# Patient Record
Sex: FEMALE | Employment: FULL TIME | URBAN - METROPOLITAN AREA
[De-identification: names, ages, dates, MRNs, and addresses within clinical notes are randomized per-mention and may not be internally consistent; named-entity substitution may affect disease eponyms.]

---

## 2020-03-18 ENCOUNTER — SKIN CANCER EXAM (OUTPATIENT)
Dept: URBAN - METROPOLITAN AREA CLINIC 34 | Facility: CLINIC | Age: 60
Setting detail: DERMATOLOGY
End: 2020-03-18

## 2020-03-18 DIAGNOSIS — D48.5 NEOPLASM OF UNCERTAIN BEHAVIOR OF SKIN: ICD-10-CM

## 2020-03-18 DIAGNOSIS — B07.0 PLANTAR WART: ICD-10-CM

## 2020-03-18 DIAGNOSIS — L72.3 SEBACEOUS CYST: ICD-10-CM

## 2020-03-18 DIAGNOSIS — D22.5 MELANOCYTIC NEVI OF TRUNK: ICD-10-CM

## 2020-03-18 PROBLEM — L82.1 OTHER SEBORRHEIC KERATOSIS: Status: RESOLVED | Noted: 2020-03-18

## 2020-03-18 PROCEDURE — 99203 OFFICE O/P NEW LOW 30 MIN: CPT

## 2020-03-18 RX ORDER — MOMETASONE FUROATE 1 MG/ML
1 ML SOLUTION TOPICAL BID
Qty: 60 | Refills: 2
Start: 2020-03-18

## 2020-05-25 PROBLEM — 305058001: Status: ACTIVE | Noted: 2020-05-25

## 2020-06-11 ENCOUNTER — OFFICE VISIT (OUTPATIENT)
Dept: URBAN - METROPOLITAN AREA CLINIC 35 | Facility: CLINIC | Age: 60
End: 2020-06-11

## 2020-06-17 ENCOUNTER — TELEPHONE ENCOUNTER (OUTPATIENT)
Dept: URBAN - METROPOLITAN AREA CLINIC 35 | Facility: CLINIC | Age: 60
End: 2020-06-17

## 2020-06-17 ENCOUNTER — OFFICE VISIT (OUTPATIENT)
Dept: URBAN - METROPOLITAN AREA SURGERY CENTER 8 | Facility: SURGERY CENTER | Age: 60
End: 2020-06-17

## 2020-06-17 RX ORDER — SERTRALINE HYDROCHLORIDE 50 MG/1
1 TABLET TABLET, FILM COATED ORAL ONCE A DAY
Status: ACTIVE | COMMUNITY

## 2020-06-17 RX ORDER — POLYETHYLENE GLYCOL 3350, SODIUM SULFATE, SODIUM CHLORIDE, POTASSIUM CHLORIDE, ASCORBIC ACID, SODIUM ASCORBATE 140-9-5.2G
AS DIRECTED KIT ORAL ONCE
Qty: 1 KIT | Refills: 0 | Status: ACTIVE | COMMUNITY
Start: 2020-05-26

## 2020-06-17 RX ORDER — DIPHENOXYLATE HYDROCHLORIDE AND ATROPINE SULFATE 2.5; .025 MG/1; MG/1
1 TABLET AS NEEDED TABLET ORAL
Status: ACTIVE | COMMUNITY

## 2020-06-17 RX ORDER — DOXAZOSIN MESYLATE 2 MG/1
1 TABLET TABLET ORAL ONCE A DAY
Status: ACTIVE | COMMUNITY

## 2020-06-17 RX ORDER — CHOLESTYRAMINE 4 G/9G
1/2 SCOOP POWDER, FOR SUSPENSION ORAL TWICE A DAY
Qty: 120 GRAM | Refills: 1 | OUTPATIENT
Start: 2020-06-17

## 2020-06-17 RX ORDER — LEVOTHYROXINE SODIUM 50 UG/1
1 TABLET IN THE MORNING ON AN EMPTY STOMACH TABLET ORAL ONCE A DAY
Status: ACTIVE | COMMUNITY

## 2020-06-17 RX ORDER — ALPRAZOLAM 0.5 MG/1
1 TABLET TABLET ORAL TWICE A DAY
Status: ACTIVE | COMMUNITY

## 2020-06-17 RX ORDER — ONDANSETRON HYDROCHLORIDE 8 MG/1
1 TABLET TABLET, FILM COATED ORAL
Status: ACTIVE | COMMUNITY

## 2020-06-29 PROBLEM — 92318000: Status: ACTIVE | Noted: 2020-06-24

## 2020-06-29 PROBLEM — 92076000: Status: ACTIVE | Noted: 2020-06-24

## 2020-06-30 ENCOUNTER — OFFICE VISIT (OUTPATIENT)
Dept: URBAN - METROPOLITAN AREA CLINIC 37 | Facility: CLINIC | Age: 60
End: 2020-06-30

## 2020-06-30 VITALS — WEIGHT: 255 LBS | BODY MASS INDEX: 40.54 KG/M2

## 2020-06-30 RX ORDER — LEVOTHYROXINE SODIUM 50 UG/1
1 TABLET IN THE MORNING ON AN EMPTY STOMACH TABLET ORAL ONCE A DAY
Status: ACTIVE | COMMUNITY

## 2020-06-30 RX ORDER — SERTRALINE HYDROCHLORIDE 50 MG/1
1 TABLET TABLET, FILM COATED ORAL ONCE A DAY
Status: ACTIVE | COMMUNITY

## 2020-06-30 RX ORDER — ALPRAZOLAM 0.5 MG/1
1 TABLET TABLET ORAL TWICE A DAY
Status: ACTIVE | COMMUNITY

## 2020-06-30 RX ORDER — CHOLESTYRAMINE 4 G/9G
1/2 SCOOP POWDER, FOR SUSPENSION ORAL TWICE A DAY
Qty: 60 GRAM | Refills: 5 | OUTPATIENT
Start: 2020-06-29

## 2020-06-30 RX ORDER — ONDANSETRON HYDROCHLORIDE 8 MG/1
1 TABLET TABLET, FILM COATED ORAL
Status: ACTIVE | COMMUNITY

## 2020-06-30 RX ORDER — DIPHENOXYLATE HYDROCHLORIDE AND ATROPINE SULFATE 2.5; .025 MG/1; MG/1
1 TABLET AS NEEDED TABLET ORAL
Status: ACTIVE | COMMUNITY

## 2020-06-30 RX ORDER — DOXAZOSIN MESYLATE 2 MG/1
1 TABLET TABLET ORAL ONCE A DAY
Status: ACTIVE | COMMUNITY

## 2020-06-30 RX ORDER — CHOLESTYRAMINE 4 G/9G
1/2 SCOOP POWDER, FOR SUSPENSION ORAL TWICE A DAY
Qty: 120 GRAM | Refills: 1 | Status: ACTIVE | COMMUNITY
Start: 2020-06-17

## 2020-06-30 NOTE — HPI-MIGRATED HPI
Interim investigations : Labs done on: ->  * 6/11/20: negative for Covid-19 * 05/05/2020, ordered by PCP:  - CMP: Glucose: 87; BUN: 9; Cr: 0.82; Na: 139; K: 4.0; Alb: 4.7; ALT: 34 (H); AST: 17; ALP: 98; Tbili: 0.4 - CBC with: WBC:10.4; RBC:4.86; Hgb:14.3; Hct:42.2; Plt: 416 (H) * 04/29/2020, ordered by PCP:  - C. diff PCR: not detected - Shiga toxins, EIA w/ reflex to Ecoli.: not detected - Campylobacter, culture: no enteric Camylobacter isolated - Salmonella and shigella, culture: No salmonela or shigella isolated - Fecal leukocyte stain: not detected - Ova and parasite: no ova or parasites seen;   Interim investigations : Imaging studies: ->  * CT scan Abd/pelvis w contrast on 05/11/2020:  1. The gallbladder is surgically absent.  2. Large bowel, small bowel demonstrate no acute findings. 3. No acute intra-abdominal inflammatory process;   Post-op OV : Patient -> denies any new changes in his/her health status since last OV;   Post-op OV : After Colonoscopy on -> 6/17/20, at which time an 8 mm pedunculated polyp and 1 small polyp were found and removed. Histology showed that the larger polyp was tubulovillous adenoma while the other one was hyperplastic polyp. No evidence of colitis. The entire colon bxx show benign colonic mucosa with no histopathological abnormality. Grade I hemorrhoids were noted upon retroflexion. Bowel prep was sub-optimal Patient has NOT been taking Cholestyramine 2 gm (1/2 scoop) BID daily for diarrhea Current BM pattern: still with loose stools every 2 hours;   Post-op OV : Patient denies -> rectal bleeding, fever, nausea & vomiting since the procedure date;

## 2020-07-09 ENCOUNTER — LAB OUTSIDE AN ENCOUNTER (OUTPATIENT)
Dept: URBAN - METROPOLITAN AREA CLINIC 37 | Facility: CLINIC | Age: 60
End: 2020-07-09

## 2020-07-09 ENCOUNTER — OFFICE VISIT (OUTPATIENT)
Dept: URBAN - METROPOLITAN AREA CLINIC 37 | Facility: CLINIC | Age: 60
End: 2020-07-09

## 2020-07-09 VITALS — WEIGHT: 253 LBS | BODY MASS INDEX: 40.22 KG/M2

## 2020-07-09 PROBLEM — 62315008: Status: ACTIVE | Noted: 2020-05-25

## 2020-07-09 PROBLEM — 721703004: Status: ACTIVE | Noted: 2020-06-24

## 2020-07-09 PROBLEM — 16932000: Status: ACTIVE | Noted: 2020-07-09

## 2020-07-09 PROBLEM — 34486009: Status: ACTIVE | Noted: 2020-05-25

## 2020-07-09 RX ORDER — DOXAZOSIN MESYLATE 2 MG/1
1 TABLET TABLET ORAL ONCE A DAY
Status: ACTIVE | COMMUNITY

## 2020-07-09 RX ORDER — DIPHENOXYLATE HYDROCHLORIDE AND ATROPINE SULFATE 2.5; .025 MG/1; MG/1
1 TABLET AS NEEDED TABLET ORAL
Status: ACTIVE | COMMUNITY

## 2020-07-09 RX ORDER — CHOLESTYRAMINE 4 G/9G
1/2 SCOOP POWDER, FOR SUSPENSION ORAL TWICE A DAY
Qty: 60 GRAM | Refills: 5 | Status: ACTIVE | COMMUNITY
Start: 2020-06-29

## 2020-07-09 RX ORDER — SERTRALINE HYDROCHLORIDE 50 MG/1
1 TABLET TABLET, FILM COATED ORAL ONCE A DAY
Status: ACTIVE | COMMUNITY

## 2020-07-09 RX ORDER — LEVOTHYROXINE SODIUM 50 UG/1
1 TABLET IN THE MORNING ON AN EMPTY STOMACH TABLET ORAL ONCE A DAY
Status: ACTIVE | COMMUNITY

## 2020-07-09 RX ORDER — ALPRAZOLAM 0.5 MG/1
1 TABLET TABLET ORAL TWICE A DAY
Status: ACTIVE | COMMUNITY

## 2020-07-09 RX ORDER — ONDANSETRON HYDROCHLORIDE 8 MG/1
1 TABLET TABLET, FILM COATED ORAL
Status: ACTIVE | COMMUNITY

## 2020-07-09 RX ORDER — CHOLESTYRAMINE 4 G/9G
1/2 SCOOP POWDER, FOR SUSPENSION ORAL QAM
Qty: 60 GRAM | Refills: 5 | OUTPATIENT
Start: 2020-07-09

## 2020-07-09 NOTE — HPI-MIGRATED HPI
Interim investigations : Labs done on: ->  * 6/11/20: negative for Covid-19 * 05/05/2020, ordered by PCP:  - CMP: Glucose: 87; BUN: 9; Cr: 0.82; Na: 139; K: 4.0; Alb: 4.7; ALT: 34 (H); AST: 17; ALP: 98; Tbili: 0.4 - CBC with: WBC:10.4; RBC:4.86; Hgb:14.3; Hct:42.2; Plt: 416 (H) * 04/29/2020, ordered by PCP:  - C. diff PCR: not detected - Shiga toxins, EIA w/ reflex to Ecoli.: not detected - Campylobacter, culture: no enteric Camylobacter isolated - Salmonella and shigella, culture: No salmonela or shigella isolated - Fecal leukocyte stain: not detected - Ova and parasite: no ova or parasites seen;   Interim investigations : Imaging studies: ->  * CT scan Abd/pelvis w contrast on 05/11/2020:  1. The gallbladder is surgically absent.  2. Large bowel, small bowel demonstrate no acute findings. 3. No acute intra-abdominal inflammatory process;   Follow up OV : Patient is here for a routine OV for -> diarrhea/nausea/vomiting This is a Telehealth visit and patient has consent for this visit;   Follow up OV : Last OV was -> 1 week ago;   Follow up OV : Patient is on -> Cholestyramine 1/2 scoop BID since last OV May need to up-titrate the dose or proceed to EGD/SBE if not better Current BM pattern: QOD with one hard stool (BS 1-2) Has been able to eat more, but not yet at baseline Still has intermittent nausea;

## 2020-07-09 NOTE — EXAM-MIGRATED EXAMINATIONS
GENERAL APPEARANCE: - alert, in mild distress, well developed, well nourished;   NECK/THYROID: - neck supple, full range of motion, trachea midline;   NEUROLOGIC: - cranial nerves 2-12 grossly intact;   PSYCH: - cooperative with exam, mood/affect full range;

## 2020-07-10 ENCOUNTER — OFFICE VISIT (OUTPATIENT)
Dept: URBAN - METROPOLITAN AREA CLINIC 35 | Facility: CLINIC | Age: 60
End: 2020-07-10

## 2020-07-10 RX ORDER — ONDANSETRON HYDROCHLORIDE 8 MG/1
1 TABLET TABLET, FILM COATED ORAL
Status: ACTIVE | COMMUNITY

## 2020-07-10 RX ORDER — CHOLESTYRAMINE 4 G/9G
1/2 SCOOP POWDER, FOR SUSPENSION ORAL TWICE A DAY
Qty: 60 GRAM | Refills: 5 | Status: ACTIVE | COMMUNITY
Start: 2020-06-29

## 2020-07-10 RX ORDER — SERTRALINE HYDROCHLORIDE 50 MG/1
1 TABLET TABLET, FILM COATED ORAL ONCE A DAY
Status: ACTIVE | COMMUNITY

## 2020-07-10 RX ORDER — LEVOTHYROXINE SODIUM 50 UG/1
1 TABLET IN THE MORNING ON AN EMPTY STOMACH TABLET ORAL ONCE A DAY
Status: ACTIVE | COMMUNITY

## 2020-07-10 RX ORDER — DOXAZOSIN MESYLATE 2 MG/1
1 TABLET TABLET ORAL ONCE A DAY
Status: ACTIVE | COMMUNITY

## 2020-07-10 RX ORDER — ALPRAZOLAM 0.5 MG/1
1 TABLET TABLET ORAL TWICE A DAY
Status: ACTIVE | COMMUNITY

## 2020-07-10 RX ORDER — CHOLESTYRAMINE 4 G/9G
1/2 SCOOP POWDER, FOR SUSPENSION ORAL QAM
Qty: 60 GRAM | Refills: 5 | Status: ACTIVE | COMMUNITY
Start: 2020-07-09

## 2020-07-10 RX ORDER — DIPHENOXYLATE HYDROCHLORIDE AND ATROPINE SULFATE 2.5; .025 MG/1; MG/1
1 TABLET AS NEEDED TABLET ORAL
Status: ACTIVE | COMMUNITY

## 2020-07-15 ENCOUNTER — OFFICE VISIT (OUTPATIENT)
Dept: URBAN - METROPOLITAN AREA SURGERY CENTER 8 | Facility: SURGERY CENTER | Age: 60
End: 2020-07-15

## 2020-07-15 ENCOUNTER — TELEPHONE ENCOUNTER (OUTPATIENT)
Dept: URBAN - METROPOLITAN AREA CLINIC 35 | Facility: CLINIC | Age: 60
End: 2020-07-15

## 2020-07-15 RX ORDER — SERTRALINE HYDROCHLORIDE 50 MG/1
1 TABLET TABLET, FILM COATED ORAL ONCE A DAY
Status: ACTIVE | COMMUNITY

## 2020-07-15 RX ORDER — DIPHENOXYLATE HYDROCHLORIDE AND ATROPINE SULFATE 2.5; .025 MG/1; MG/1
1 TABLET AS NEEDED TABLET ORAL
Status: ACTIVE | COMMUNITY

## 2020-07-15 RX ORDER — CHOLESTYRAMINE 4 G/9G
1/2 SCOOP POWDER, FOR SUSPENSION ORAL TWICE A DAY
Qty: 60 GRAM | Refills: 5 | Status: ACTIVE | COMMUNITY
Start: 2020-06-29

## 2020-07-15 RX ORDER — LEVOTHYROXINE SODIUM 50 UG/1
1 TABLET IN THE MORNING ON AN EMPTY STOMACH TABLET ORAL ONCE A DAY
Status: ACTIVE | COMMUNITY

## 2020-07-15 RX ORDER — DOXAZOSIN MESYLATE 2 MG/1
1 TABLET TABLET ORAL ONCE A DAY
Status: ACTIVE | COMMUNITY

## 2020-07-15 RX ORDER — ALPRAZOLAM 0.5 MG/1
1 TABLET TABLET ORAL TWICE A DAY
Status: ACTIVE | COMMUNITY

## 2020-07-15 RX ORDER — CHOLESTYRAMINE 4 G/9G
1/2 SCOOP POWDER, FOR SUSPENSION ORAL QAM
Qty: 60 GRAM | Refills: 5 | Status: ACTIVE | COMMUNITY
Start: 2020-07-09

## 2020-07-15 RX ORDER — ONDANSETRON HYDROCHLORIDE 8 MG/1
1 TABLET TABLET, FILM COATED ORAL
Status: ACTIVE | COMMUNITY

## 2020-07-15 RX ORDER — OMEPRAZOLE 40 MG/1
1 CAPSULE 30 MINUTES BEFORE MORNING MEAL CAPSULE, DELAYED RELEASE ORAL
Qty: 60 | Refills: 2 | OUTPATIENT
Start: 2020-07-15

## 2020-07-28 ENCOUNTER — TELEPHONE ENCOUNTER (OUTPATIENT)
Dept: URBAN - METROPOLITAN AREA CLINIC 35 | Facility: CLINIC | Age: 60
End: 2020-07-28

## 2020-07-31 ENCOUNTER — TELEPHONE ENCOUNTER (OUTPATIENT)
Dept: URBAN - METROPOLITAN AREA CLINIC 35 | Facility: CLINIC | Age: 60
End: 2020-07-31

## 2020-07-31 ENCOUNTER — OFFICE VISIT (OUTPATIENT)
Dept: URBAN - METROPOLITAN AREA CLINIC 37 | Facility: CLINIC | Age: 60
End: 2020-07-31

## 2020-07-31 VITALS — WEIGHT: 252 LBS | BODY MASS INDEX: 40.06 KG/M2

## 2020-07-31 RX ORDER — DOXAZOSIN MESYLATE 2 MG/1
1 TABLET TABLET ORAL ONCE A DAY
Status: ACTIVE | COMMUNITY

## 2020-07-31 RX ORDER — OMEPRAZOLE 40 MG/1
1 CAPSULE 30 MINUTES BEFORE MORNING MEAL CAPSULE, DELAYED RELEASE ORAL
Qty: 60 | Refills: 5
Start: 2020-07-30

## 2020-07-31 RX ORDER — CHOLESTYRAMINE 4 G/9G
1/2 SCOOP POWDER, FOR SUSPENSION ORAL QAM
Qty: 60 GRAM | Refills: 5 | OUTPATIENT

## 2020-07-31 RX ORDER — ONDANSETRON HYDROCHLORIDE 8 MG/1
1 TABLET TABLET, FILM COATED ORAL
Status: ACTIVE | COMMUNITY

## 2020-07-31 RX ORDER — LEVOTHYROXINE SODIUM 50 UG/1
1 TABLET IN THE MORNING ON AN EMPTY STOMACH TABLET ORAL ONCE A DAY
Status: ACTIVE | COMMUNITY

## 2020-07-31 RX ORDER — DIPHENOXYLATE HYDROCHLORIDE AND ATROPINE SULFATE 2.5; .025 MG/1; MG/1
1 TABLET AS NEEDED TABLET ORAL
Status: ACTIVE | COMMUNITY

## 2020-07-31 RX ORDER — CHOLESTYRAMINE 4 G/9G
1/2 SCOOP POWDER, FOR SUSPENSION ORAL TWICE A DAY
Qty: 60 GRAM | Refills: 5 | Status: ACTIVE | COMMUNITY
Start: 2020-06-29

## 2020-07-31 RX ORDER — ALPRAZOLAM 0.5 MG/1
1 TABLET TABLET ORAL TWICE A DAY
Status: ACTIVE | COMMUNITY

## 2020-07-31 RX ORDER — OMEPRAZOLE 40 MG/1
1 CAPSULE 30 MINUTES BEFORE MORNING MEAL CAPSULE, DELAYED RELEASE ORAL
Qty: 60 | Refills: 2 | Status: ACTIVE | COMMUNITY
Start: 2020-07-15

## 2020-07-31 RX ORDER — SERTRALINE HYDROCHLORIDE 50 MG/1
1 TABLET TABLET, FILM COATED ORAL ONCE A DAY
Status: ACTIVE | COMMUNITY

## 2020-07-31 NOTE — EXAM-MIGRATED EXAMINATIONS
GENERAL APPEARANCE: - alert, well developed, well nourished, in moderate distress;   HEAD: - normocephalic, atraumatic;   EYES: - sclera anicteric bilaterally;   NECK/THYROID: - neck supple, full range of motion, no cervical lymphadenopathy, no thyroid nodules, no thyromegaly, trachea midline;   NEUROLOGIC: - cranial nerves 2-12 grossly intact;   PSYCH: - cooperative with exam, mood/affect full range, tearful at time;

## 2020-07-31 NOTE — HPI-MIGRATED HPI
Post-op OV : Patient denies -> rectal bleeding, fever, nausea & vomiting since the procedure date;   Post-op OV : Patient -> denies any new changes in his/her health status since last OV. 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 Severe esophagitis was present in the distal esophagus with bxx confirming moderate acute and chronic inflammation and reactive changes which may be seen in reflux esophagitis with intestinal metaplasia, indefinite for dysplasia. A small hiatal hernia present at GE junction with out obstruction or gangrene. Mild gastritis was noted in the gastric antrum with bxx confirming minimal chronic gastritis. No H. pylori or IM identified. Normal duodenum with bxx confirming normal villous pattern. Patient was prescribed Omeprazole 40 mg PO BID after the procedure ;   Post-op OV : Patient reports of current symptoms -> Patient continues taking Cholestyramine, 1/2 scoop QAM - BID Current BM: occasional diarrhea, especially on the days that she at work;

## 2020-08-13 ENCOUNTER — OFFICE VISIT (OUTPATIENT)
Dept: URBAN - METROPOLITAN AREA CLINIC 37 | Facility: CLINIC | Age: 60
End: 2020-08-13

## 2020-08-20 ENCOUNTER — OFFICE VISIT (OUTPATIENT)
Dept: URBAN - METROPOLITAN AREA CLINIC 37 | Facility: CLINIC | Age: 60
End: 2020-08-20

## 2020-08-20 VITALS — WEIGHT: 252 LBS | BODY MASS INDEX: 40.5 KG/M2 | HEIGHT: 66 IN

## 2020-08-20 RX ORDER — CHOLESTYRAMINE 4 G/9G
1/2 SCOOP POWDER, FOR SUSPENSION ORAL QAM
Qty: 60 GRAM | Refills: 2

## 2020-08-20 RX ORDER — DIPHENOXYLATE HYDROCHLORIDE AND ATROPINE SULFATE 2.5; .025 MG/1; MG/1
1 TABLET AS NEEDED TABLET ORAL
Status: ACTIVE | COMMUNITY

## 2020-08-20 RX ORDER — DOXAZOSIN MESYLATE 2 MG/1
1 TABLET TABLET ORAL ONCE A DAY
Status: ACTIVE | COMMUNITY

## 2020-08-20 RX ORDER — ONDANSETRON HYDROCHLORIDE 8 MG/1
1 TABLET TABLET, FILM COATED ORAL
Status: ACTIVE | COMMUNITY

## 2020-08-20 RX ORDER — OMEPRAZOLE 40 MG/1
1 CAPSULE 30 MINUTES BEFORE MORNING MEAL CAPSULE, DELAYED RELEASE ORAL
Qty: 60 | Refills: 5 | Status: ACTIVE | COMMUNITY
Start: 2020-07-30

## 2020-08-20 RX ORDER — LEVOTHYROXINE SODIUM 50 UG/1
1 TABLET IN THE MORNING ON AN EMPTY STOMACH TABLET ORAL ONCE A DAY
Status: ACTIVE | COMMUNITY

## 2020-08-20 RX ORDER — CHOLESTYRAMINE 4 G/9G
1/2 SCOOP POWDER, FOR SUSPENSION ORAL TWICE A DAY
Qty: 60 GRAM | Refills: 5 | Status: ACTIVE | COMMUNITY
Start: 2020-06-29

## 2020-08-20 RX ORDER — OMEPRAZOLE 40 MG/1
1 CAPSULE 30 MINUTES BEFORE MORNING MEAL CAPSULE, DELAYED RELEASE ORAL
Qty: 60 | Refills: 5 | OUTPATIENT

## 2020-08-20 RX ORDER — ALPRAZOLAM 0.5 MG/1
1 TABLET TABLET ORAL TWICE A DAY
Status: ACTIVE | COMMUNITY

## 2020-08-20 RX ORDER — SERTRALINE HYDROCHLORIDE 50 MG/1
1 TABLET TABLET, FILM COATED ORAL ONCE A DAY
Status: ACTIVE | COMMUNITY

## 2020-08-20 NOTE — HPI-MIGRATED HPI
Follow up OV : Patient is here for a routine OV for -> Louise's Esophagus and Post-cholecytectomy syndrome;   Follow up OV : Last OV was -> 2 weeks ago Louise's Esophagus/Gastritis:  Last EGD done 07/2020, small hital hernia present, endocopic evidence of BE and Gastritis. No H.pylori  Patient continues on Omeprazole 40 mg BID Current symptoms: "symptoms are improving"  Post-cholecystectomy syndrome:  Lap CCY done 2001 Patient continues on Cholestyramine 4 gm, 1/2 scoop daily to BID PRN Current symptoms: 1-2x soft - formed stool/day  Fatigue: Things are better but still not feeling 100% of her normal self Still feels "wiped out" after 1 day of work;

## 2020-09-03 ENCOUNTER — LAB OUTSIDE AN ENCOUNTER (OUTPATIENT)
Dept: URBAN - METROPOLITAN AREA CLINIC 37 | Facility: CLINIC | Age: 60
End: 2020-09-03

## 2020-09-03 ENCOUNTER — OFFICE VISIT (OUTPATIENT)
Dept: URBAN - METROPOLITAN AREA CLINIC 37 | Facility: CLINIC | Age: 60
End: 2020-09-03

## 2020-09-03 VITALS — BODY MASS INDEX: 40.5 KG/M2 | HEIGHT: 66 IN | WEIGHT: 252 LBS

## 2020-09-03 RX ORDER — SERTRALINE HYDROCHLORIDE 50 MG/1
1 TABLET TABLET, FILM COATED ORAL ONCE A DAY
Status: ACTIVE | COMMUNITY

## 2020-09-03 RX ORDER — OMEPRAZOLE 40 MG/1
1 CAPSULE 30 MINUTES BEFORE MORNING MEAL CAPSULE, DELAYED RELEASE ORAL
Qty: 60 | Refills: 5 | OUTPATIENT

## 2020-09-03 RX ORDER — OMEPRAZOLE 40 MG/1
1 CAPSULE 30 MINUTES BEFORE MORNING MEAL CAPSULE, DELAYED RELEASE ORAL
Qty: 60 | Refills: 5 | Status: ACTIVE | COMMUNITY

## 2020-09-03 RX ORDER — ONDANSETRON HYDROCHLORIDE 8 MG/1
1 TABLET TABLET, FILM COATED ORAL
Status: ACTIVE | COMMUNITY

## 2020-09-03 RX ORDER — ALPRAZOLAM 0.5 MG/1
1 TABLET TABLET ORAL TWICE A DAY
Status: ACTIVE | COMMUNITY

## 2020-09-03 RX ORDER — DIPHENOXYLATE HYDROCHLORIDE AND ATROPINE SULFATE 2.5; .025 MG/1; MG/1
1 TABLET AS NEEDED TABLET ORAL
Status: ACTIVE | COMMUNITY

## 2020-09-03 RX ORDER — LEVOTHYROXINE SODIUM 50 UG/1
1 TABLET IN THE MORNING ON AN EMPTY STOMACH TABLET ORAL ONCE A DAY
Status: ACTIVE | COMMUNITY

## 2020-09-03 RX ORDER — CHOLESTYRAMINE 4 G/9G
1/2 SCOOP POWDER, FOR SUSPENSION ORAL TWICE A DAY
Qty: 60 GRAM | Refills: 5 | Status: ACTIVE | COMMUNITY
Start: 2020-06-29

## 2020-09-03 RX ORDER — CHOLESTYRAMINE 4 G/9G
1/2 SCOOP POWDER, FOR SUSPENSION ORAL BID
Qty: 30 GRAM | Refills: 2

## 2020-09-03 RX ORDER — DOXAZOSIN MESYLATE 2 MG/1
1 TABLET TABLET ORAL ONCE A DAY
Status: ACTIVE | COMMUNITY

## 2020-09-17 ENCOUNTER — OFFICE VISIT (OUTPATIENT)
Dept: URBAN - METROPOLITAN AREA CLINIC 37 | Facility: CLINIC | Age: 60
End: 2020-09-17

## 2020-09-17 VITALS — HEIGHT: 66 IN

## 2020-09-17 RX ORDER — ALPRAZOLAM 0.5 MG/1
1 TABLET TABLET ORAL TWICE A DAY
COMMUNITY

## 2020-09-17 RX ORDER — SERTRALINE HYDROCHLORIDE 50 MG/1
1 TABLET TABLET, FILM COATED ORAL ONCE A DAY
COMMUNITY

## 2020-09-17 RX ORDER — CHOLESTYRAMINE 4 G/9G
1/2 SCOOP POWDER, FOR SUSPENSION ORAL TWICE A DAY
Qty: 60 GRAM | Refills: 5 | COMMUNITY
Start: 2020-06-29

## 2020-09-17 RX ORDER — DIPHENOXYLATE HYDROCHLORIDE AND ATROPINE SULFATE 2.5; .025 MG/1; MG/1
1 TABLET AS NEEDED TABLET ORAL
COMMUNITY

## 2020-09-17 RX ORDER — LEVOTHYROXINE SODIUM 50 UG/1
1 TABLET IN THE MORNING ON AN EMPTY STOMACH TABLET ORAL ONCE A DAY
COMMUNITY

## 2020-09-17 RX ORDER — CHOLESTYRAMINE 4 G/9G
1/2 SCOOP POWDER, FOR SUSPENSION ORAL BID
Qty: 30 GRAM | Refills: 2 | COMMUNITY

## 2020-09-17 RX ORDER — ONDANSETRON HYDROCHLORIDE 8 MG/1
1 TABLET TABLET, FILM COATED ORAL
COMMUNITY

## 2020-09-17 RX ORDER — DOXAZOSIN MESYLATE 2 MG/1
1 TABLET TABLET ORAL ONCE A DAY
COMMUNITY

## 2020-09-17 RX ORDER — OMEPRAZOLE 40 MG/1
1 CAPSULE 30 MINUTES BEFORE MORNING MEAL CAPSULE, DELAYED RELEASE ORAL
Qty: 60 | Refills: 5 | COMMUNITY

## 2020-09-23 ENCOUNTER — LAB OUTSIDE AN ENCOUNTER (OUTPATIENT)
Dept: URBAN - METROPOLITAN AREA CLINIC 37 | Facility: CLINIC | Age: 60
End: 2020-09-23

## 2020-09-24 LAB
ALBUMIN/GLOBULIN RATIO: 1.5
ALBUMIN: 4.2
ALKALINE PHOSPHATASE: 142
ALT: 63
AST: 30
BILIRUBIN, TOTAL: 0.6
BUN/CREATININE RATIO: (no result)
CALCIUM, IONIZED: 4.8
CALCIUM: 9.4
CARBON DIOXIDE: 23
CHLORIDE: 105
CREATININE: 0.62
EGFR AFRICAN AMERICAN: 115
EGFR NON-AFR. AMERICAN: 99
GLOBULIN: 2.8
GLUCOSE: 89
MAGNESIUM: 2
POTASSIUM: 4.3
PROTEIN, TOTAL: 7
SODIUM: 140
TSH: 0.97
UREA NITROGEN (BUN): 10

## 2020-10-06 ENCOUNTER — OFFICE VISIT (OUTPATIENT)
Dept: URBAN - METROPOLITAN AREA CLINIC 35 | Facility: CLINIC | Age: 60
End: 2020-10-06

## 2020-10-06 VITALS — HEIGHT: 66 IN | BODY MASS INDEX: 40.5 KG/M2 | WEIGHT: 252 LBS

## 2020-10-06 RX ORDER — DOXAZOSIN MESYLATE 2 MG/1
1 TABLET TABLET ORAL ONCE A DAY
Status: ACTIVE | COMMUNITY

## 2020-10-06 RX ORDER — OMEPRAZOLE 40 MG/1
1 CAPSULE 30 MINUTES BEFORE MORNING MEAL CAPSULE, DELAYED RELEASE ORAL
Qty: 60 | Refills: 5 | OUTPATIENT

## 2020-10-06 RX ORDER — DIPHENOXYLATE HYDROCHLORIDE AND ATROPINE SULFATE 2.5; .025 MG/1; MG/1
1 TABLET AS NEEDED TABLET ORAL
Status: ACTIVE | COMMUNITY

## 2020-10-06 RX ORDER — SERTRALINE HYDROCHLORIDE 50 MG/1
1 TABLET TABLET, FILM COATED ORAL ONCE A DAY
Status: ACTIVE | COMMUNITY

## 2020-10-06 RX ORDER — OMEPRAZOLE 40 MG/1
1 CAPSULE 30 MINUTES BEFORE MORNING MEAL CAPSULE, DELAYED RELEASE ORAL
Qty: 60 | Refills: 5 | Status: ACTIVE | COMMUNITY

## 2020-10-06 RX ORDER — LEVOTHYROXINE SODIUM 50 UG/1
1 TABLET IN THE MORNING ON AN EMPTY STOMACH TABLET ORAL ONCE A DAY
Status: ACTIVE | COMMUNITY

## 2020-10-06 RX ORDER — ALPRAZOLAM 0.5 MG/1
1 TABLET TABLET ORAL TWICE A DAY
Status: ACTIVE | COMMUNITY

## 2020-10-06 RX ORDER — ONDANSETRON HYDROCHLORIDE 8 MG/1
1 TABLET AS NEEDED TABLET, FILM COATED ORAL EVERY 8 HOURS
Qty: 42 | Refills: 1
Start: 2020-10-06

## 2020-10-06 RX ORDER — CHOLESTYRAMINE 4 G/9G
1/2 SCOOP POWDER, FOR SUSPENSION ORAL TWICE A DAY
Qty: 60 GRAM | Refills: 5 | Status: ACTIVE | COMMUNITY
Start: 2020-06-29

## 2020-10-06 RX ORDER — ONDANSETRON HYDROCHLORIDE 8 MG/1
1 TABLET TABLET, FILM COATED ORAL
Status: ACTIVE | COMMUNITY

## 2020-10-06 RX ORDER — CHOLESTYRAMINE 4 G/9G
1/2 SCOOP POWDER, FOR SUSPENSION ORAL BID
Qty: 30 GRAM | Refills: 5

## 2020-10-27 ENCOUNTER — LAB OUTSIDE AN ENCOUNTER (OUTPATIENT)
Dept: URBAN - METROPOLITAN AREA CLINIC 35 | Facility: CLINIC | Age: 60
End: 2020-10-27

## 2020-11-12 ENCOUNTER — OFFICE VISIT (OUTPATIENT)
Dept: URBAN - METROPOLITAN AREA CLINIC 37 | Facility: CLINIC | Age: 60
End: 2020-11-12

## 2020-11-30 ENCOUNTER — LAB OUTSIDE AN ENCOUNTER (OUTPATIENT)
Dept: URBAN - METROPOLITAN AREA CLINIC 37 | Facility: CLINIC | Age: 60
End: 2020-11-30

## 2020-12-01 LAB
ALBUMIN/GLOBULIN RATIO: 1.5
ALBUMIN: 4.1
ALKALINE PHOSPHATASE: 122
ALT: 30
AST: 25
BILIRUBIN, DIRECT: 0.1
BILIRUBIN, INDIRECT: 0.2
BILIRUBIN, TOTAL: 0.3
GLOBULIN: 2.7
PROTEIN, TOTAL: 6.8

## 2020-12-07 PROBLEM — 60119000: Status: ACTIVE | Noted: 2020-07-31

## 2020-12-08 ENCOUNTER — OFFICE VISIT (OUTPATIENT)
Dept: URBAN - METROPOLITAN AREA CLINIC 35 | Facility: CLINIC | Age: 60
End: 2020-12-08

## 2020-12-08 RX ORDER — ONDANSETRON 8 MG/1
1 TABLET ON THE TONGUE AND ALLOW TO DISSOLVE  AS NEEDED TABLET, ORALLY DISINTEGRATING ORAL TID
Qty: 40 | Refills: 2 | OUTPATIENT
Start: 2020-12-08

## 2020-12-08 RX ORDER — SERTRALINE HYDROCHLORIDE 50 MG/1
1 TABLET TABLET, FILM COATED ORAL ONCE A DAY
Status: ACTIVE | COMMUNITY

## 2020-12-08 RX ORDER — ONDANSETRON HYDROCHLORIDE 8 MG/1
1 TABLET AS NEEDED TABLET, FILM COATED ORAL EVERY 8 HOURS
Qty: 42 | Refills: 1

## 2020-12-08 RX ORDER — LEVOTHYROXINE SODIUM 50 UG/1
1 TABLET IN THE MORNING ON AN EMPTY STOMACH TABLET ORAL ONCE A DAY
Status: ACTIVE | COMMUNITY

## 2020-12-08 RX ORDER — DOXAZOSIN MESYLATE 2 MG/1
1 TABLET TABLET ORAL ONCE A DAY
Status: ACTIVE | COMMUNITY

## 2020-12-08 RX ORDER — DIPHENOXYLATE HYDROCHLORIDE AND ATROPINE SULFATE 2.5; .025 MG/1; MG/1
1 TABLET AS NEEDED TABLET ORAL
Status: ACTIVE | COMMUNITY

## 2020-12-08 RX ORDER — ONDANSETRON HYDROCHLORIDE 8 MG/1
1 TABLET TABLET, FILM COATED ORAL
Status: ACTIVE | COMMUNITY

## 2020-12-08 RX ORDER — ALPRAZOLAM 0.5 MG/1
1 TABLET TABLET ORAL TWICE A DAY
Status: ACTIVE | COMMUNITY

## 2020-12-08 RX ORDER — CHOLESTYRAMINE 4 G/9G
1/2 SCOOP POWDER, FOR SUSPENSION ORAL BID
Qty: 30 GRAM | Refills: 5 | OUTPATIENT

## 2020-12-08 RX ORDER — OMEPRAZOLE 40 MG/1
1 CAPSULE 30 MINUTES BEFORE MORNING MEAL CAPSULE, DELAYED RELEASE ORAL
Qty: 60 | Refills: 5 | Status: ACTIVE | COMMUNITY

## 2020-12-08 RX ORDER — OMEPRAZOLE 40 MG/1
1 CAPSULE 30 MINUTES BEFORE MORNING MEAL CAPSULE, DELAYED RELEASE ORAL
Qty: 90 | Refills: 4 | OUTPATIENT

## 2020-12-08 RX ORDER — CHOLESTYRAMINE 4 G/9G
1/2 SCOOP POWDER, FOR SUSPENSION ORAL TWICE A DAY
Qty: 60 GRAM | Refills: 5 | Status: DISCONTINUED | COMMUNITY
Start: 2020-06-29

## 2020-12-08 NOTE — HPI-MIGRATED HPI
Interim investigations : Labs done on: ->  * 11/30/2020, see below;   Follow up OV : Patient is here for a routine OV for -> Luoise's Esophagus and Post-cholecytectomy syndrome;   Follow up OV : Last OV was -> 2 month ago,  Louise's Esophagus/Gastritis:  Last EGD done 07/2020, small hiatal hernia was present, + endocopic evidence of BE and Gastritis. No H. pylori  Patient continues on Omeprazole 40 mg BID since 07/2020 Plan to continue PPI BID until the end of 2020 and then taper to once a day; Surveillance EGD in 07/2021 Current symptoms: No GERD symptoms  Post-cholecystectomy syndrome/Bloating:  Lap CCY done 2001 Colonoscopy on 06/17/2020, w/o evidence colitis Patient stopped Cholestyramine d/t constipation, even with 1/8 of the scoop  Nausea:  Patient continues on Zofran 8 mg daily   Elevated ALT:  Incidental findings on labs done 09/2020: ALT: 63 (H); AST: 30; ALP: 142 Patient is here to discuss repeat labs, see below; Duration: 1 day;

## 2020-12-21 ENCOUNTER — TELEPHONE ENCOUNTER (OUTPATIENT)
Dept: URBAN - METROPOLITAN AREA CLINIC 35 | Facility: CLINIC | Age: 60
End: 2020-12-21

## 2021-01-05 ENCOUNTER — OFFICE VISIT (OUTPATIENT)
Dept: URBAN - METROPOLITAN AREA CLINIC 35 | Facility: CLINIC | Age: 61
End: 2021-01-05

## 2021-03-07 ENCOUNTER — LAB OUTSIDE AN ENCOUNTER (OUTPATIENT)
Dept: URBAN - METROPOLITAN AREA CLINIC 35 | Facility: CLINIC | Age: 61
End: 2021-03-07

## 2021-03-23 ENCOUNTER — OFFICE VISIT (OUTPATIENT)
Dept: URBAN - METROPOLITAN AREA CLINIC 35 | Facility: CLINIC | Age: 61
End: 2021-03-23

## 2021-03-23 RX ORDER — OMEPRAZOLE 40 MG/1
1 CAPSULE 30 MINUTES BEFORE MORNING MEAL CAPSULE, DELAYED RELEASE ORAL
Qty: 90 | Refills: 4 | OUTPATIENT

## 2021-03-23 RX ORDER — ONDANSETRON HYDROCHLORIDE 8 MG/1
1 TABLET AS NEEDED TABLET, FILM COATED ORAL EVERY 8 HOURS
Qty: 42 | Refills: 1

## 2021-03-23 RX ORDER — CHOLESTYRAMINE 4 G/9G
1/2 SCOOP POWDER, FOR SUSPENSION ORAL BID
Qty: 30 GRAM | Refills: 5 | OUTPATIENT

## 2021-03-23 RX ORDER — ONDANSETRON 8 MG/1
1 TABLET ON THE TONGUE AND ALLOW TO DISSOLVE  AS NEEDED TABLET, ORALLY DISINTEGRATING ORAL TID
Qty: 40 | Refills: 2 | OUTPATIENT

## 2021-03-23 NOTE — HPI-MIGRATED HPI
Interim investigations : Labs done on: ->  * 03/07/2021, not done yet? * 11/30/2020, see below;   Follow up OV : Patient is here for a routine OV for -> Louise's Esophagus and Post-cholecytectomy syndrome;   Follow up OV : Last OV was -> 3 months ago  Louise's Esophagus/Gastritis:  Last EGD done 07/2020, small hiatal hernia was present, + endocopic evidence of BE and Gastritis. No H. pylori  Patient continues on Omeprazole 40 mg BID  Plan to continue PPI BID until the end of 2020 and then taper to once a day; Surveillance EGD in 07/2021 Current symptoms: ??  Post-cholecystectomy syndrome/Bloating:  Lap CCY done 2001 Colonoscopy on 06/17/2020, w/o evidence colitis Patient stopped Cholestyramine d/t constipation, even with 1/8 of the scoop  Nausea:  Patient continues on Zofran 8 mg daily   Elevated ALT:  Incidental findings on labs done 09/2020: ALT: 63 (H); AST: 30; ALP: 142 Patient is here to discuss repeat labs, see below;

## 2021-03-25 ENCOUNTER — OFFICE VISIT (OUTPATIENT)
Dept: URBAN - METROPOLITAN AREA CLINIC 37 | Facility: CLINIC | Age: 61
End: 2021-03-25

## 2021-03-25 VITALS — HEIGHT: 66 IN

## 2021-04-15 ENCOUNTER — LAB OUTSIDE AN ENCOUNTER (OUTPATIENT)
Dept: URBAN - METROPOLITAN AREA CLINIC 37 | Facility: CLINIC | Age: 61
End: 2021-04-15

## 2021-04-16 LAB
ALBUMIN/GLOBULIN RATIO: 1.5
ALBUMIN: 4.3
ALKALINE PHOSPHATASE: 115
ALT: 43
AST: 34
BILIRUBIN, DIRECT: 0.1
BILIRUBIN, INDIRECT: 0.4
BILIRUBIN, TOTAL: 0.5
GLOBULIN: 2.8
PROTEIN, TOTAL: 7.1

## 2021-04-20 ENCOUNTER — OFFICE VISIT (OUTPATIENT)
Dept: URBAN - METROPOLITAN AREA CLINIC 35 | Facility: CLINIC | Age: 61
End: 2021-04-20

## 2021-04-20 VITALS — HEIGHT: 66 IN | BODY MASS INDEX: 39.7 KG/M2 | WEIGHT: 247 LBS

## 2021-04-20 RX ORDER — ONDANSETRON HYDROCHLORIDE 8 MG/1
1 TABLET AS NEEDED TABLET, FILM COATED ORAL EVERY 8 HOURS
Qty: 42 | Refills: 1 | Status: ACTIVE | COMMUNITY

## 2021-04-20 RX ORDER — SERTRALINE 50 MG/1
1 TABLET TABLET, FILM COATED ORAL ONCE A DAY
Status: ACTIVE | COMMUNITY

## 2021-04-20 RX ORDER — OMEPRAZOLE 40 MG/1
1 CAPSULE 30 MINUTES BEFORE MORNING MEAL CAPSULE, DELAYED RELEASE ORAL
Qty: 90 | Refills: 4

## 2021-04-20 RX ORDER — ALPRAZOLAM 0.5 MG/1
1 TABLET TABLET ORAL TWICE A DAY
Status: ACTIVE | COMMUNITY

## 2021-04-20 RX ORDER — LEVOTHYROXINE SODIUM 50 UG/1
1 TABLET IN THE MORNING ON AN EMPTY STOMACH TABLET ORAL ONCE A DAY
Status: ACTIVE | COMMUNITY

## 2021-04-20 RX ORDER — DOXAZOSIN MESYLATE 2 MG/1
1 TABLET TABLET ORAL ONCE A DAY
Status: ACTIVE | COMMUNITY

## 2021-04-20 RX ORDER — ONDANSETRON 8 MG/1
1 TABLET ON THE TONGUE AND ALLOW TO DISSOLVE  AS NEEDED TABLET, ORALLY DISINTEGRATING ORAL TID
Qty: 40 | Refills: 2 | Status: DISCONTINUED | COMMUNITY

## 2021-04-20 RX ORDER — PHENTERMINE HYDROCHLORIDE 37.5 MG/1
1 TABLET TABLET ORAL ONCE A DAY
Qty: 30 TABLET | Refills: 1 | OUTPATIENT
Start: 2021-04-20

## 2021-04-20 RX ORDER — OMEPRAZOLE 40 MG/1
1 CAPSULE 30 MINUTES BEFORE MORNING MEAL CAPSULE, DELAYED RELEASE ORAL
Qty: 90 | Refills: 4 | Status: ACTIVE | COMMUNITY

## 2021-04-20 RX ORDER — ONDANSETRON 8 MG/1
1 TABLET ON THE TONGUE AND ALLOW TO DISSOLVE  AS NEEDED TABLET, ORALLY DISINTEGRATING ORAL TID
Qty: 30 | Refills: 3

## 2021-04-20 NOTE — HPI-MIGRATED HPI
Follow up OV : Last OV was -> 3 months ago  Louise's Esophagus/Gastritis:  Last EGD done 07/2020, small hiatal hernia was present, + endocopic evidence of BE and Gastritis. No H. pylori  Patient has decreased Omeprazole 40 mg daily since the beginning of 2021 Current symptoms: no GERD symptoms  Post-cholecystectomy syndrome/Bloating:  Lap CCY done 2001 Colonoscopy on 06/17/2020, w/o evidence colitis Patient was previously taking Cholestyramine but discontinued since last OV d/t constipation, even with 1/8 of the scoop Therefore was advised to take Benefiber/Metamucil daily  Current BM: occasional diarrhea  Nausea:  Patient continues on Zofran 8 mg daily  Reports: still with episodic nausea  Elevated ALT:  Incidental findings on labs done 09/2020: ALT: 63 (H); AST: 30; ALP: 142 Patient is here to discuss repeat labs, see below;   Follow up OV : Patient is here for a routine OV for -> Louise's Esophagus and Post-cholecytectomy syndrome;   Interim investigations : Labs done on: ->  * 04/15/2021, see below;

## 2021-04-21 ENCOUNTER — TELEPHONE ENCOUNTER (OUTPATIENT)
Dept: URBAN - METROPOLITAN AREA CLINIC 35 | Facility: CLINIC | Age: 61
End: 2021-04-21

## 2021-04-21 RX ORDER — PHENTERMINE HYDROCHLORIDE 37.5 MG/1
1 TABLET TABLET ORAL ONCE A DAY
Qty: 30 TABLET | Refills: 1 | OUTPATIENT
Start: 2021-04-20

## 2021-04-21 RX ORDER — SERTRALINE 50 MG/1
1 TABLET TABLET, FILM COATED ORAL ONCE A DAY
Status: ACTIVE | COMMUNITY

## 2021-04-21 RX ORDER — DOXAZOSIN MESYLATE 2 MG/1
1 TABLET TABLET ORAL ONCE A DAY
Status: ACTIVE | COMMUNITY

## 2021-04-21 RX ORDER — LEVOTHYROXINE SODIUM 50 UG/1
1 TABLET IN THE MORNING ON AN EMPTY STOMACH TABLET ORAL ONCE A DAY
Status: ACTIVE | COMMUNITY

## 2021-04-21 RX ORDER — ALPRAZOLAM 0.5 MG/1
1 TABLET TABLET ORAL TWICE A DAY
Status: ACTIVE | COMMUNITY

## 2021-04-21 RX ORDER — ONDANSETRON HYDROCHLORIDE 8 MG/1
1 TABLET AS NEEDED TABLET, FILM COATED ORAL EVERY 8 HOURS
Qty: 42 | Refills: 1 | Status: ACTIVE | COMMUNITY

## 2021-04-21 RX ORDER — OMEPRAZOLE 40 MG/1
1 CAPSULE 30 MINUTES BEFORE MORNING MEAL CAPSULE, DELAYED RELEASE ORAL
Qty: 90 | Refills: 4 | Status: ACTIVE | COMMUNITY

## 2021-04-21 RX ORDER — ONDANSETRON 8 MG/1
1 TABLET ON THE TONGUE AND ALLOW TO DISSOLVE  AS NEEDED TABLET, ORALLY DISINTEGRATING ORAL TID
Qty: 30 | Refills: 3 | Status: ACTIVE | COMMUNITY

## 2021-04-21 RX ORDER — PHENTERMINE HYDROCHLORIDE 37.5 MG/1
1 TABLET TABLET ORAL ONCE A DAY
Qty: 30 TABLET | Refills: 1 | Status: ACTIVE | COMMUNITY
Start: 2021-04-20

## 2021-05-04 ENCOUNTER — LAB OUTSIDE AN ENCOUNTER (OUTPATIENT)
Dept: URBAN - METROPOLITAN AREA CLINIC 35 | Facility: CLINIC | Age: 61
End: 2021-05-04

## 2021-05-25 ENCOUNTER — OFFICE VISIT (OUTPATIENT)
Dept: URBAN - METROPOLITAN AREA CLINIC 35 | Facility: CLINIC | Age: 61
End: 2021-05-25

## 2021-06-17 ENCOUNTER — TELEPHONE ENCOUNTER (OUTPATIENT)
Dept: URBAN - METROPOLITAN AREA CLINIC 35 | Facility: CLINIC | Age: 61
End: 2021-06-17

## 2021-06-17 ENCOUNTER — OFFICE VISIT (OUTPATIENT)
Dept: URBAN - METROPOLITAN AREA CLINIC 37 | Facility: CLINIC | Age: 61
End: 2021-06-17

## 2021-06-17 VITALS — HEIGHT: 66 IN

## 2021-06-17 RX ORDER — ONDANSETRON 8 MG/1
1 TABLET ON THE TONGUE AND ALLOW TO DISSOLVE  AS NEEDED TABLET, ORALLY DISINTEGRATING ORAL TID
Qty: 30 | Refills: 3

## 2021-06-17 RX ORDER — PHENTERMINE HYDROCHLORIDE 37.5 MG/1
1 TABLET TABLET ORAL ONCE A DAY
Qty: 30 TABLET | Refills: 1 | OUTPATIENT

## 2021-06-17 RX ORDER — OMEPRAZOLE 40 MG/1
1 CAPSULE 30 MINUTES BEFORE MORNING MEAL CAPSULE, DELAYED RELEASE ORAL
Qty: 90 | Refills: 4

## 2021-06-17 NOTE — EXAM-MIGRATED EXAMINATIONS
GENERAL APPEARANCE: - alert, in no acute distress, well developed, well nourished;   HEAD: - normocephalic, atraumatic;   EYES: - sclera anicteric bilaterally;   NECK/THYROID: - neck supple, full range of motion, no cervical lymphadenopathy, no lymphadenopathy, no thyroid nodules, no thyromegaly, trachea midline;   NEUROLOGIC: - cranial nerves 2-12 grossly intact;   PSYCH: - good eye contact, mood/affect full range;

## 2021-06-17 NOTE — HPI-MIGRATED HPI
Follow up OV : Patient is here for a routine OV for -> Louise's Esophagus and Post-cholecytectomy syndrome;   Follow up OV : Last OV was -> 8 weeks ago,  Louise's Esophagus/Gastritis:  Last EGD done 07/2020, small hiatal hernia was present, + endocopic evidence of BE and Gastritis. No H. pylori  Patient has decreased Omeprazole 40 mg daily since the beginning of 2021 Current symptoms: ??  Post-cholecystectomy syndrome/Bloating:  Lap CCY done 2001 Colonoscopy on 06/17/2020, w/o evidence colitis Patient was previously taking Cholestyramine but discontinued since last OV d/t constipation, even with 1/8 of the scoop Therefore was advised to take Benefiber/Metamucil daily  Current BM: ??/  Nausea:  Patient continues on Zofran 8 mg daily  Reports: ??  Elevated ALT:  Incidental findings on labs done 09/2020: ALT: 63 (H); AST: 30; ALP: 142 Patient is here to discuss repeat labs, see below Last OV patient was prescribed Phentermine 37.5 mg daily to suppress appetitie and help lose weight Patient was supposed to repeat labs but hasn't done so??;   Interim investigations : Labs done on: ->  * 05/04/2021, not done yet??;

## 2021-07-12 ENCOUNTER — LAB OUTSIDE AN ENCOUNTER (OUTPATIENT)
Dept: URBAN - METROPOLITAN AREA CLINIC 37 | Facility: CLINIC | Age: 61
End: 2021-07-12

## 2021-07-13 LAB
ABSOLUTE BASOPHILS: 43
ABSOLUTE EOSINOPHILS: 277
ABSOLUTE LYMPHOCYTES: 2244
ABSOLUTE MONOCYTES: 682
ABSOLUTE NEUTROPHILS: 3855
ALBUMIN/GLOBULIN RATIO: 1.5
ALBUMIN: 3.9
ALKALINE PHOSPHATASE: 99
ALT: 19
AST: 15
BASOPHILS: 0.6
BILIRUBIN, TOTAL: 0.3
BUN/CREATININE RATIO: (no result)
CALCIUM: 8.9
CARBON DIOXIDE: 25
CHLORIDE: 104
CREATININE: 0.72
EGFR AFRICAN AMERICAN: 107
EGFR NON-AFR. AMERICAN: 92
EOSINOPHILS: 3.9
GLOBULIN: 2.6
GLUCOSE: 89
HEMATOCRIT: 38.6
HEMOGLOBIN: 13.3
LYMPHOCYTES: 31.6
MCH: 29.6
MCHC: 34.5
MCV: 86
MONOCYTES: 9.6
MPV: 9.2
NEUTROPHILS: 54.3
PLATELET COUNT: 330
POTASSIUM: 3.9
PROTEIN, TOTAL: 6.5
RDW: 12.4
RED BLOOD CELL COUNT: 4.49
SODIUM: 138
UREA NITROGEN (BUN): 11
WHITE BLOOD CELL COUNT: 7.1

## 2021-07-15 ENCOUNTER — DASHBOARD ENCOUNTERS (OUTPATIENT)
Age: 61
End: 2021-07-15

## 2021-07-15 ENCOUNTER — OFFICE VISIT (OUTPATIENT)
Dept: URBAN - METROPOLITAN AREA CLINIC 37 | Facility: CLINIC | Age: 61
End: 2021-07-15

## 2021-07-15 ENCOUNTER — LAB OUTSIDE AN ENCOUNTER (OUTPATIENT)
Dept: URBAN - METROPOLITAN AREA CLINIC 37 | Facility: CLINIC | Age: 61
End: 2021-07-15

## 2021-07-15 VITALS — WEIGHT: 244 LBS | BODY MASS INDEX: 39.21 KG/M2 | HEIGHT: 66 IN

## 2021-07-15 PROBLEM — 422587007: Status: ACTIVE | Noted: 2020-07-09

## 2021-07-15 PROBLEM — 428283002: Status: ACTIVE | Noted: 2020-07-09

## 2021-07-15 PROBLEM — 238136002: Status: ACTIVE | Noted: 2020-07-31

## 2021-07-15 PROBLEM — 39839004: Status: ACTIVE | Noted: 2020-07-30

## 2021-07-15 PROBLEM — 4556007: Status: ACTIVE | Noted: 2020-07-30

## 2021-07-15 PROBLEM — 409673008: Status: ACTIVE | Noted: 2020-10-06

## 2021-07-15 PROBLEM — 116289008: Status: ACTIVE | Noted: 2020-09-03

## 2021-07-15 PROBLEM — 90782003: Status: ACTIVE | Noted: 2020-07-09

## 2021-07-15 PROBLEM — 302914006: Status: ACTIVE | Noted: 2020-07-30

## 2021-07-15 PROBLEM — 247808006: Status: ACTIVE | Noted: 2020-07-31

## 2021-07-15 RX ORDER — OMEPRAZOLE 40 MG/1
1 CAPSULE 30 MINUTES BEFORE MORNING MEAL CAPSULE, DELAYED RELEASE ORAL
Qty: 90 | Refills: 4

## 2021-07-15 RX ORDER — SERTRALINE 50 MG/1
1 TABLET TABLET, FILM COATED ORAL ONCE A DAY
Status: ACTIVE | COMMUNITY

## 2021-07-15 RX ORDER — OMEPRAZOLE 40 MG/1
1 CAPSULE 30 MINUTES BEFORE MORNING MEAL CAPSULE, DELAYED RELEASE ORAL
Qty: 90 | Refills: 4 | Status: ACTIVE | COMMUNITY

## 2021-07-15 RX ORDER — ALPRAZOLAM 0.5 MG/1
1 TABLET TABLET ORAL TWICE A DAY
Status: ACTIVE | COMMUNITY

## 2021-07-15 RX ORDER — PHENTERMINE HYDROCHLORIDE 37.5 MG/1
1 TABLET TABLET ORAL ONCE A DAY
Qty: 30 TABLET | Refills: 1

## 2021-07-15 RX ORDER — ONDANSETRON 8 MG/1
1 TABLET ON THE TONGUE AND ALLOW TO DISSOLVE  AS NEEDED TABLET, ORALLY DISINTEGRATING ORAL TID
Qty: 30 | Refills: 3

## 2021-07-15 RX ORDER — ONDANSETRON 8 MG/1
1 TABLET ON THE TONGUE AND ALLOW TO DISSOLVE  AS NEEDED TABLET, ORALLY DISINTEGRATING ORAL TID
Qty: 30 | Refills: 3 | Status: ACTIVE | COMMUNITY

## 2021-07-15 RX ORDER — DOXAZOSIN MESYLATE 2 MG/1
1 TABLET TABLET ORAL ONCE A DAY
Status: ACTIVE | COMMUNITY

## 2021-07-15 RX ORDER — LEVOTHYROXINE SODIUM 50 UG/1
1 TABLET IN THE MORNING ON AN EMPTY STOMACH TABLET ORAL ONCE A DAY
Status: ACTIVE | COMMUNITY

## 2021-07-15 RX ORDER — PHENTERMINE HYDROCHLORIDE 37.5 MG/1
1 TABLET TABLET ORAL ONCE A DAY
Qty: 30 TABLET | Refills: 1 | Status: ON HOLD | COMMUNITY

## 2021-07-15 NOTE — HPI-MIGRATED HPI
Follow up OV : Patient is here for a routine OV for -> Louise's Esophagus and Post-cholecytectomy syndrome;   Follow up OV : Last OV was -> 3 months ago,  Louise's Esophagus/Gastritis:  Last EGD done 07/2020, small hiatal hernia was present, + endocopic evidence of BE and Gastritis. No H. pylori  Patient has decreased Omeprazole 40 mg daily since the beginning of 2021 Current symptoms: Denies UGI symptoms. No dysphagia  Post-cholecystectomy syndrome/Bloating:  Lap CCY done 2001 Colonoscopy on 06/17/2020, w/o evidence colitis Patient was previously taking Cholestyramine but discontinued since last OV d/t constipation, even with 1/8 of the scoop Therefore was advised to take Benefiber/Metamucil daily but no need for medication Current BM: 2 BM daily, soft-loose stools   Nausea:  Patient continues on Zofran 8 mg PRN, 1-2 daily  Reports: occasional nausea  Elevated ALT:  Incidental findings on labs done 09/2020: ALT: 63 (H); AST: 30; ALP: 142 Patient is here to discuss repeat labs, see below Last OV patient was prescribed Phentermine 37.5 mg daily to suppress appetitie and help lose weight Reports: Denies palpitation/increased heart rate while on medication  Patient has stopped medication for about 2-3 weeks b/c she ran out of medication  Patient had repeat labs done, see below;   Interim investigations : Labs done on: ->  * 07/12/2021, see below;